# Patient Record
Sex: FEMALE | Race: WHITE
[De-identification: names, ages, dates, MRNs, and addresses within clinical notes are randomized per-mention and may not be internally consistent; named-entity substitution may affect disease eponyms.]

---

## 2018-12-19 ENCOUNTER — HOSPITAL ENCOUNTER (OUTPATIENT)
Dept: HOSPITAL 95 - LAB | Age: 28
Discharge: HOME | End: 2018-12-19
Attending: OBSTETRICS & GYNECOLOGY
Payer: COMMERCIAL

## 2018-12-19 DIAGNOSIS — Z34.80: Primary | ICD-10-CM

## 2019-01-16 ENCOUNTER — HOSPITAL ENCOUNTER (INPATIENT)
Dept: HOSPITAL 95 - OBS | Age: 29
LOS: 2 days | Discharge: HOME | End: 2019-01-18
Attending: ADVANCED PRACTICE MIDWIFE | Admitting: ADVANCED PRACTICE MIDWIFE
Payer: COMMERCIAL

## 2019-01-16 VITALS — HEIGHT: 65.98 IN | BODY MASS INDEX: 31.39 KG/M2 | WEIGHT: 195.33 LBS

## 2019-01-16 DIAGNOSIS — Z3A.40: ICD-10-CM

## 2019-01-16 DIAGNOSIS — O48.0: Primary | ICD-10-CM

## 2019-01-16 LAB
BASOPHILS # BLD AUTO: 0.03 K/MM3 (ref 0–0.23)
BASOPHILS NFR BLD AUTO: 0 % (ref 0–2)
DEPRECATED RDW RBC AUTO: 42.4 FL (ref 35.1–46.3)
EOSINOPHIL # BLD AUTO: 0.02 K/MM3 (ref 0–0.68)
EOSINOPHIL NFR BLD AUTO: 0 % (ref 0–6)
ERYTHROCYTE [DISTWIDTH] IN BLOOD BY AUTOMATED COUNT: 14.6 % (ref 11.7–14.2)
HCT VFR BLD AUTO: 33.8 % (ref 33–51)
HGB BLD-MCNC: 10.6 G/DL (ref 11.5–16)
IMM GRANULOCYTES # BLD AUTO: 0.06 K/MM3 (ref 0–0.1)
IMM GRANULOCYTES NFR BLD AUTO: 1 % (ref 0–1)
LYMPHOCYTES # BLD AUTO: 1.75 K/MM3 (ref 0.84–5.2)
LYMPHOCYTES NFR BLD AUTO: 15 % (ref 21–46)
MCHC RBC AUTO-ENTMCNC: 31.4 G/DL (ref 31.5–36.5)
MCV RBC AUTO: 81 FL (ref 80–100)
MONOCYTES # BLD AUTO: 0.86 K/MM3 (ref 0.16–1.47)
MONOCYTES NFR BLD AUTO: 8 % (ref 4–13)
NEUTROPHILS # BLD AUTO: 8.71 K/MM3 (ref 1.96–9.15)
NEUTROPHILS NFR BLD AUTO: 76 % (ref 41–73)
NRBC # BLD AUTO: 0 K/MM3 (ref 0–0.02)
NRBC BLD AUTO-RTO: 0 /100 WBC (ref 0–0.2)
PLATELET # BLD AUTO: 190 K/MM3 (ref 150–400)
RBC #/AREA URNS HPF: (no result) /HPF (ref 0–2)
SP GR SPEC: 1.02 (ref 1–1.02)
UROBILINOGEN UR STRIP-MCNC: (no result) MG/DL

## 2019-01-16 PROCEDURE — 10907ZC DRAINAGE OF AMNIOTIC FLUID, THERAPEUTIC FROM PRODUCTS OF CONCEPTION, VIA NATURAL OR ARTIFICIAL OPENING: ICD-10-PCS | Performed by: ADVANCED PRACTICE MIDWIFE

## 2019-01-16 PROCEDURE — 0HQ9XZZ REPAIR PERINEUM SKIN, EXTERNAL APPROACH: ICD-10-PCS | Performed by: ADVANCED PRACTICE MIDWIFE

## 2019-01-16 NOTE — NUR
BREASTFEEDING ASSIST
 MOM HAS SHORT SHANKED NIPPLES THAT ARE RETRACTED AT BASSES AND HAS BEEN
HAVING DIFFICULTY LATCHING. BABY SLEEPING AT THIS TIME AND NOT INTRESTED IN
BREASTFEEDING. DEMONSTRATED CHEST TO CHEST LAID BACK POSTION. BREASTFEEDING ED
DONE. MOM ENCOURAGED TO CALL FOR ASSIST WHEN BABY WAKES. DEMONSTRAED SHIELD
WHICH WAS IN THE ROOM AS SHE MAY NEED IT WHEN HER MILK COMES IN AND BREASTS
GET HARDER.NEW BEGINNINGS BOOK AND BREASTFEEDING BOOK DISCUSSED.

## 2019-01-16 NOTE — NUR
SNS AND SHIELD BROUGHT IN TO PT'S ROOM. PT EDUCATED ON HOW TO USE AND CLEAN.
NB APPEARS TO BE DOING WELL WITH THE SETUP.

## 2019-01-17 LAB
DEPRECATED RDW RBC AUTO: 42.9 FL (ref 35.1–46.3)
ERYTHROCYTE [DISTWIDTH] IN BLOOD BY AUTOMATED COUNT: 14.7 % (ref 11.7–14.2)
HCT VFR BLD AUTO: 28.1 % (ref 33–51)
HGB BLD-MCNC: 8.7 G/DL (ref 11.5–16)
MCHC RBC AUTO-ENTMCNC: 31 G/DL (ref 31.5–36.5)
MCV RBC AUTO: 81 FL (ref 80–100)
NRBC # BLD AUTO: 0 K/MM3 (ref 0–0.02)
NRBC BLD AUTO-RTO: 0 /100 WBC (ref 0–0.2)
PLATELET # BLD AUTO: 137 K/MM3 (ref 150–400)

## 2019-01-17 NOTE — NUR
LACTATION CONSULT. BABY IS NOW 24 HOURS OLD AND STARTING TO SELF WAKEN SOME
FOR FEEDINGS. NIPPLES ARE VERY SHORT SHANKED, MINIMAL FIRMING WITH STIMULI.
ONLY BF ABOUT 1 WEEK WITH FIRST CHILD AND DOES NOT REMEMBER GETTING ENGORGED
AND HE DID NOT LATCH WELL. THIS BABY DOES COORDINATE SUCK ON A FINGER AFTER
THE POSTERIOR TONGUE IS PRESSED DOWN. INSTRUCT/DEMO HOW TO DO TUG OF WAR WITH
BABY TO HELP COORDINATE SUCK AND IMPROVE SUCTION FOR LATCHING. BABY IS NOT
TRIGGERING TO TRY AND SUCK WHEN PRESENTED WITH NIPPLE ON HIS LIP. INITIALLY
CRIES WITH SHIELD, THEN DID LATCH AND BEGIN SUCKLING, NARROW LATCH AND
RESISTANT TO WIDENING. LEFT A COMPRESSION STRIP. INSTRUCT/DEMO USE OF SHIELD,
WIDENING HIS LATCH ON DAVID SHIELD, AND CLEANING SHIELD. INSTRUCT IN CHANGES TO
EXPECT DURING THE FIRST WEEK WITH BABY AND WITH FEEDINGS AND REFERRED TO BF
BROCHURE AND PAGE 18 OF BF BOOKLET FOR PHOTOS AND INFORMATION. QUESTIONS
ANSWERED. INSTRUCT/DEMO SELF EBM AND BARELY A DROP WAS EXPRESSED. HISTORY OF
LOW PRODUCTION. INSTRUCT TO TRY COORDINATING HIS SUCK BEFORE PUTTING TO
BREAST, RATHER THAN CONTINUEING TO USE FORMULA VIA SNS TO STIMULATE HIS
SUCKING REFLEX. MOM IS LOVING WITH BABY.

## 2019-01-18 NOTE — NUR
BANDS MATCHED, ALL DC TEACHING DONE, ALL QUESTIONS ANSWERED. GAVE MOM 2 30CC
SYRINGES AND ANOTHER FEEDING TUBE FOR SUPPLEMENTING AT THE BR. INSTRUCTED TO
CONT TO SUPPLEMENT UNTIL SUNDAY WHEN SEEN BY RN.

## 2019-01-18 NOTE — NUR
IN TO ROOM WITH NB, MOM AWAKE NOW. WILL DO ASSESMENT & THEN ATTEMPT FEEDING.
MOM STATES FEEDING HAS BEEN HARD, WILL HAVE LACATION IN ROOM THIS AM AND THEN
RN. MOM HAS A DC ORDER. RX FOR COLACE AND IRON GIVEN.

## 2019-01-18 NOTE — NUR
LACTATION CONSULT FOLLOW UP. BABY IS STILL NOT COORDINATING SUCK VERY WELL AND
IS PUTTING MINIMAL EFFORT INTO SUCKING. JAUNDICE IS INCREASING AND WT LOSS IS
9%. INSTRUCT TO MOM THE IMPORTANCE OF INCREASING THE INTAKE FOR BABY, EVERY
FEEDING. MOM IS ABLE TO SELF EBM TODAY, SMALL DROPS, BETTER THAN YESTERDAY.
SHE IS NOT VERY ASSERTIVE WITH PUTTING BABY TO BREAST, DEMO HOW TO STIMULATE
BABY MORE TO HELP HER WAKEN AND START SUCKING, AND BABY IS TO EAT Q2-3 HOURS
NOW, WAKE HER FOR FEEDINGS IF SHE DOESN'T WAKEN HERSELF. NOW AT 4 HOURS SINCE
LAST FEEDING.
MOM ABLE TO APPLY SHIELD WELL, POSITIONS BABY WITH HEAD A LITTLE FORWARD,
REINSTRUCT HAND POSITION TO HELP BABY WIDEN LATCH BETTER WITH CHIN MORE TO
BREAST. 20CC OF FORMULA VIA SYRINGE/FEEDING TUBE INTO SHIELD, NEEDED TO INJECT
SUPPLEMENT AS BABY WAS NOT ACTIVELY SUCKING, WOULD SUCK ENOUGH TO SWALLOW. SHE
WAS ASLEEP WHEN 20CC WERE DONE. DID NOT MOVE BREAST TISSUE WITH SUCK STRENGTH.
MOM PLANS HOME TODAY, TO START PUMPING FOR 10-15 MINUTES AFTER EACH BF SESSION
TO BRING SUPPLY IN, GIVE SUPPLEMENT OF 20CC QF TODAY, INCREASE TO 30CC QF
TOMORROW IF MILK NOT IN YET. IF MILK IN AND SHE IS PUMPING ENOUGH, GIVE EBM
RATHER THAN FORMULA IN SUPPLEMENT. WHEN SUCKING INCREASES THEN WILL START
DECREASING SUPPLEMENT. IF MILK DOES NOT COME IN WELL, INCREASE AMOUNT OF
SUPPLEMENT BY 10CC/DAY, EACH DAY. MOM AND FRIEND BOTH LISTENING AND AGREE. HAS
F/U SCHEDULED IN CLINIC IN 2 DAYS.